# Patient Record
Sex: MALE | Race: AMERICAN INDIAN OR ALASKA NATIVE
[De-identification: names, ages, dates, MRNs, and addresses within clinical notes are randomized per-mention and may not be internally consistent; named-entity substitution may affect disease eponyms.]

---

## 2018-09-24 ENCOUNTER — HOSPITAL ENCOUNTER (EMERGENCY)
Dept: HOSPITAL 5 - ED | Age: 33
Discharge: HOME | End: 2018-09-24
Payer: SELF-PAY

## 2018-09-24 VITALS — SYSTOLIC BLOOD PRESSURE: 149 MMHG | DIASTOLIC BLOOD PRESSURE: 82 MMHG

## 2018-09-24 DIAGNOSIS — J06.9: Primary | ICD-10-CM

## 2018-09-24 DIAGNOSIS — F17.200: ICD-10-CM

## 2018-09-24 PROCEDURE — 94640 AIRWAY INHALATION TREATMENT: CPT

## 2018-09-24 PROCEDURE — 71046 X-RAY EXAM CHEST 2 VIEWS: CPT

## 2018-09-24 PROCEDURE — 96372 THER/PROPH/DIAG INJ SC/IM: CPT

## 2018-09-24 PROCEDURE — 99283 EMERGENCY DEPT VISIT LOW MDM: CPT

## 2018-09-24 NOTE — EMERGENCY DEPARTMENT REPORT
Minor Respiratory





- HPI


Chief Complaint: Upper Respiratory Infection


Stated Complaint: CHEST COLD


Time Seen by Provider: 18 07:41


Duration: 1 Day


Severity: moderate


Minor Respiratory: Yes Rhinorrhea (nasal congestion), Yes Sore Throat (coughing)

, Yes Able to Tolerate Fluids, Yes Cough (dry cough), Yes Sick Contacts, Yes 

Fever, No Ear Pain, No Hemoptysis, No Chest Pain, No Shortness of Breath


Other History: This is a 32-year-old male here report that he has nasal 

congestion and runny nose, drainage of the back of the throat and cough in 

worse with lying down.  He reports that he has sore throat with cough and that 

is 5/10.  Reports fever and chills and that he was exposed to his girlfriend 

had bronchitis .  She reported his symptoms has been going on for about a week 

now.  Denies any chest pain or shortness of breath.  Over-the-counter 

medication not helping and immunization up-to-date.  Denies any drooling.  Sore 

throat worse with coughing.  No alleviating factor





ED Review of Systems


ROS: 


Stated complaint: CHEST COLD


Other details as noted in HPI





Constitutional: chills, fever


Eyes: denies: eye pain, eye discharge, vision change


ENT: denies: ear pain, throat pain


Respiratory: cough.  denies: shortness of breath, SOB with exertion, SOB at rest

, stridor, wheezing


Cardiovascular: denies: chest pain, palpitations, edema, syncope


Gastrointestinal: denies: abdominal pain, nausea, vomiting, diarrhea


Musculoskeletal: denies: back pain, joint swelling, arthralgia, myalgia


Skin: denies: pruritus, rash, lesions


Neurological: numbness, paresthesias.  denies: vertigo, headache, abnormal gait





ED Past Medical Hx





- Past Medical History


Previous Medical History?: No





- Surgical History


Past Surgical History?: No





- Family History


Family history: hypertension





- Social History


Smoking Status: Current Every Day Smoker


Substance Use Type: None





- Medications


Home Medications: 


 Home Medications











 Medication  Instructions  Recorded  Confirmed  Last Taken  Type


 


Azithromycin [Zithromax Z-BRYANT] 250 mg PO DAILY 5 Days #1 pkg 18  Unknown 

Rx


 


Cetirizine HCl [ZyrTEC] 10 mg PO QAM 14 Days #14 capsule 18  Unknown Rx


 


Fluticasone [Flonase] 1 spray NS QDAY 14 Days #1 bottle 18  Unknown Rx


 


Ibuprofen [Motrin] 600 mg PO Q8H PRN #12 tablet 18  Unknown Rx


 


methylPREDNISolone [Medrol Dose 4 mg PO DAILY #1 tab.ds.pk 18  Unknown Rx





Bryant]     














Minor Respiratory Exam





- Exam


General: 


Vital signs noted. No distress. Alert and acting appropriately.


32-year-old male well-nourished well-developed in no acute distress


HEENT: Yes Moist Mucous Membranes, Yes Rhinorrhea (is congestion), No 

Pharyngeal Erythema, No Pharyngeal Exudates, No Conjuctival Injection, No 

Frontal Tenderness, No Maxillary Tenderness


Ear: Neither TM Bulge, Neither TM Erythema, Neither EAC Pain, Neither EAC 

Discharge


Neck: Yes Supple (full range of motion, no C-spine tenderness), No Adenopathy


Lungs: Yes Good Air Exchange, Yes Wheezes (and wheezing upper lung fields), Yes 

Cough (dry), No Ronchi, No Stridor, No Labored Respirations, No Retractions, No 

Use of Accessory Muscles, No Other Abnormal Lung Sounds


Heart: No Regular (tachycardia 108), No Murmur


Abdomen: Yes Normal Bowel Sounds (normal bowel sounds in all quadrants), No 

Tenderness, No Peritoneal Signs


Skin: No Rash, No Edema


Neurologic: 


Al





Alert and oriented 3, normal gait, normal speech


Musculoskeletal: 


Unremarkable.





No cce. + 2 pulses in all extremities, no neurovascular compromise





ED Course


 Vital Signs











  18





  05:46


 


Temperature 100 F H


 


Pulse Rate 108 H


 


Respiratory 18





Rate 


 


Blood Pressure 149/82


 


O2 Sat by Pulse 97





Oximetry 








 Vital Signs











  18





  05:46 07:57 08:26


 


Temperature 100 F H  99.2 F


 


Pulse Rate 108 H  78


 


Pulse Rate [  74 





Posterior   





Bilateral   





Throughout]   


 


Respiratory 18  100 H





Rate   


 


Respiratory  18 





Rate [Posterior   





Bilateral   





Throughout]   


 


Blood Pressure 149/82  


 


O2 Sat by Pulse 97  





Oximetry   














- Reevaluation(s)


Reevaluation #1: 





18 08:23


Patient given DuoNeb 1 nebulizer treatment and Decadron 10 mg IM in the 

emergency room.  He was given Motrin 800 mg by mouth.  Temperature and heart 

rate is better.











ED Medical Decision Making





- Radiology Data


Radiology results: pending, report reviewed





Necessary dictated by radiologist and report reviewed by myself.  See report 

below.





Patient: FELY GERARD MR#: [id number] 


: 1985 Acct:[id number] 


Age/Sex: 32 / M ADM Date: 18 


Loc: ED 


Attending Dr: 








Ordering Physician: TERRA WEBER MD 


Date of Service: 18 


Procedure(s): XR chest routine 2V 


Accession Number(s): [id number] 





cc: ED MD TIA 





Fluoro Time In Minutes: 





FINAL REPORT 





EXAM: XR CHEST ROUTINE 2V 





HISTORY: cough and congestion 





TECHNIQUE: PA and lateral chest radiographs 





PRIORS: None. 





FINDINGS: 


No mediastinal shift. Cardiac silhouette is not enlarged. No 


pneumothorax, effusion, or focal pulmonary opacity. No acute 


skeletal finding. 





IMPRESSION: 


No focal pulmonary opacity. 











Transcribed By: MB 


Dictated By: ISMA BOYCE MD 


Electronically Authenticated By: ISMA BOYCE MD 


Signed Date/Time: 18 











DD/DT: 18 


TD/TT: 18





- Medical Decision Making





Is a 32-year-old male well-nourished well-developed in no acute distress.  

Patient here with cold and cough with fever to be seen.








Diagnostics: Chest x-ray reveals no acute cardiopulmonary processes





Assessment/plan


Upper respiratory with cough and congestion-patient given Decadron 10 mg IM and 

emergency room and one amp 1 nebulizer with relief of coughing and scant 

wheezing.  Z-Bryant, Flonase, Medrol Dosepak and Zyrtec


Fever in adults-Motrin 800 mg given in triage area with temperature down to 

99.2.  Encourage hydration and will discharge home with Motrin for sore throat 

and








Discussed the patient is diagnosis, medication and treatment plan, x-ray 

results.  I discussed with him that he needs to follow up with primary care 

physician in 2-3 days and if he does not have one to follow up with outside 

[facility].  Patient stable, vital signs are stable, low-grade fever, he 

said he is feeling better no pain at present.  Discharge home with prescription 

for Medrol Dosepak, Z-Bryant, Flonase, Zyrtec and Motrin.





- Differential Diagnosis


PNA, bronchitis, viral syndrome, sinusitis, URI with cough and congestion


Critical care attestation.: 


If time is entered above; I have spent that time in minutes in the direct care 

of this critically ill patient, excluding procedure time.








ED Disposition


Clinical Impression: 


 URI with cough and congestion





Fever


Qualifiers:


 Fever type: unspecified Qualified Code(s): R50.9 - Fever, unspecified





Disposition: DC- TO HOME OR SELFCARE


Is pt being admited?: No


Does the pt Need Aspirin: No


Condition: Stable


Instructions:  Fever in Adults (ED), Upper Respiratory Infection (ED), Acute 

Cough (ED)


Additional Instructions: 


Please take antibiotic as prescribed


Follow-up with primary care physician in 2-3 days


If your condition worsens to include difficulty breathing, swallowing, chest 

pain, nausea and vomiting and fever, please return to the emergency room ASAP.


Take Medrol Dosepak, Zyrtec and Flonase to relieve congestion


Increasing fluid intake


Use nasal saline wash to flush and nostrils.





Referrals: 


PRIMARY CARE,MD [Primary Care Provider] - 2-3 Days


Forms:  Work/School Release Form(ED)

## 2018-09-24 NOTE — XRAY REPORT
FINAL REPORT



EXAM:  XR CHEST ROUTINE 2V



HISTORY:  cough and congestion 



TECHNIQUE:  PA and lateral chest radiographs 



PRIORS:  None.



FINDINGS:  

No mediastinal shift. Cardiac silhouette is not enlarged.  No

pneumothorax, effusion, or focal pulmonary opacity. No acute

skeletal finding. 



IMPRESSION:  

No focal pulmonary opacity.